# Patient Record
Sex: MALE | Race: WHITE | NOT HISPANIC OR LATINO | Employment: FULL TIME | URBAN - METROPOLITAN AREA
[De-identification: names, ages, dates, MRNs, and addresses within clinical notes are randomized per-mention and may not be internally consistent; named-entity substitution may affect disease eponyms.]

---

## 2017-12-09 ENCOUNTER — OFFICE VISIT (OUTPATIENT)
Dept: URGENT CARE | Facility: CLINIC | Age: 52
End: 2017-12-09
Payer: COMMERCIAL

## 2017-12-09 PROCEDURE — G0383 LEV 4 HOSP TYPE B ED VISIT: HCPCS

## 2017-12-09 PROCEDURE — 12041 INTMD RPR N-HF/GENIT 2.5CM/<: CPT

## 2017-12-10 NOTE — PROGRESS NOTES
Assessment    1  Finger laceration (883 0) (T94 673L)   1 5 cm macerated laceration of tip of left middle finger  Plan  Finger laceration    · Start: Cephalexin 500 MG Oral Capsule; TAKE 2 CAPSULE Twice daily   Complicated closure of laceration  Discussion/Summary  Discussion Summary:   Keep the wound clean and covered  Elevate the hand when possible  He may change the dressing daily or whenever it is dirty  Apply hydrogen peroxide and antibiotic ointment  Follow-up for suture removal in 7-10 days  Return sooner for any problems including swelling pain discharge redness or warmth  Medication Side Effects Reviewed: Possible side effects of new medications were reviewed with the patient/guardian today  Understands and agrees with treatment plan: The treatment plan was reviewed with the patient/guardian  The patient/guardian understands and agrees with the treatment plan   Counseling Documentation With Imm: The patient, patient's family was counseled regarding instructions for management,-- impressions  Chief Complaint    1  Skin Wound  Chief Complaint Free Text Note Form: Patient relates was using a circular saw x1 hour ago and piece of wood was going fall and he went to grab it and circular saw cut left middle finger  Last tetanus 2/26/13  No active bleeding  History of Present Illness  HPI: Patient relates was using a circular saw x1 hour ago and piece of wood was going fall and he went to grab it and circular saw cut left middle finger  Last tetanus 2/26/13  No active bleeding  Hospital Based Practices Required Assessment: Reason DV Screen not done: wife at bedside   Depression And Suicide Screen  Reason suicide screen not done: wife at bedside  Prefered Language is  english  Primary Language is  english  Review of Systems  Focused-Male:  Constitutional: no fever or chills, feels well, no tiredness, no recent weight loss or weight gain    Cardiovascular: no complaints of slow or fast heart rate, no chest pain, no palpitations, no leg claudication or lower extremity edema  Respiratory: no complaints of shortness of breath, no wheezing or cough, no dyspnea on exertion, no orthopnea or PND  Musculoskeletal: as noted in HPI  Active Problems  1  Finger laceration (883 0) (A06 014W)    Past Medical History  1  No pertinent past medical history    Family History  Mother   1  Family history of diabetes mellitus (V18 0) (Z83 3)  Father   2  Family history of cardiac disorder (V17 49) (Z82 49)    Social History   · Never a smoker    Surgical History  1  History of Knee Surgery Left  2  History of Knee Surgery Right    Current Meds  1  No Reported Medications Recorded    Allergies    1  No Known Drug Allergies    Vitals  Signs   Recorded: [id number] 11:49AM   Temperature: 99 2 F, Tympanic  Heart Rate: 83  Respiration: 18  Systolic: 954, RUE, Sitting  Diastolic: 86, RUE, Sitting  Height: 5 ft 8 in  Weight: 250 lb   BMI Calculated: 38 01  BSA Calculated: 2 25  O2 Saturation: 97, RA  Pain Scale: 2    Physical Exam  Exam shows macerated wound the tip of his left middle finger  No active bleeding  Neurovascular is intact  He has full range motion  Neither the nail no over the bone is involved  Procedure   Procedure: wound repair  The wound was located on the left 3rd finger(s)  The wound was complex  The wound involved the was irregular  The wound was contaminated  the neurovascular exam was normal  there was no tendon injury  The site was prepped with Betadine and cleansed  Anesthesia: Local anesthetic was administered  Lidocaine 2 ml, 1%, without epinephrine was injected  Closure: The cutaneous layer was closed with 4 sutures of 5-0 nylon--   simple interrupted sutures were used for the skin closure  Dressing: a sterile dressing was placed-- and-- an antibiotic ointment was applied  Patient Status:  the patient tolerated the procedure well   There were no complications      Signatures Electronically signed by : Itzel Macdonald DO; Dec  9 2017 12:31PM EST                       (Author)    Electronically signed by : Itzel Macdonald DO; Dec 13 2017  8:38AM EST                       (Author)

## 2017-12-19 ENCOUNTER — OFFICE VISIT (OUTPATIENT)
Dept: URGENT CARE | Facility: CLINIC | Age: 52
End: 2017-12-19
Payer: COMMERCIAL

## 2017-12-19 PROCEDURE — G0382 LEV 3 HOSP TYPE B ED VISIT: HCPCS

## 2017-12-20 NOTE — PROGRESS NOTES
Assessment  1  Encounter for removal of sutures (V58 32) (Z48 02)    Discussion/Summary  Discussion Summary:   Cleansed wound with betadine and removed 5 sutureskeep wound dry, clean, and covered  Medication Side Effects Reviewed: Possible side effects of new medications were reviewed with the patient/guardian today  Understands and agrees with treatment plan: The treatment plan was reviewed with the patient/guardian  The patient/guardian understands and agrees with the treatment plan   Counseling Documentation With Imm: The patient, patient's family was counseled regarding instructions for management,-- patient and family education,-- importance of compliance with treatment  Chief Complaint  1  Skin Wound  Chief Complaint Free Text Note Form: Stitch removal on left middle finger      History of Present Illness  HPI: 51yo M presents for suture removal from L middle finger  Pt had sutures placed her 10 days ago  Pt denies difficulty with sutures  Hospital Based Practices Required Assessment:  Abuse And Domestic Violence Screen   Yes, the patient is safe at home  -- The patient states no one is hurting them  Depression And Suicide Screen  No, the patient has not had thoughts of hurting themself  No, the patient has not felt depressed in the past 7 days  Prefered Language is  [place]  Primary Language is  English  Review of Systems  Focused-Male:  Constitutional: no fever or chills, feels well, no tiredness, no recent weight loss or weight gain  ENT: no complaints of earache, no loss of hearing, no nosebleeds or nasal discharge, no sore throat or hoarseness  Cardiovascular: no complaints of slow or fast heart rate, no chest pain, no palpitations, no leg claudication or lower extremity edema  Respiratory: no complaints of shortness of breath, no wheezing or cough, no dyspnea on exertion, no orthopnea or PND    Gastrointestinal: no complaints of abdominal pain, no constipation, no nausea or vomiting, no diarrhea or bloody stools  Genitourinary: no complaints of dysuria or incontinence, no hesitancy, no nocturia, no genital lesion, no inadequacy of penile erection  Musculoskeletal: no complaints of arthralgia, no myalgia, no joint swelling or stiffness, no limb pain or swelling  Integumentary: skin wound, but-- no rashes,-- no itching,-- no dry skin-- and-- no skin lesions  Neurological: no complaints of headache, no confusion, no numbness or tingling, no dizziness or fainting  ROS Reviewed:   ROS reviewed  Active Problems  1  Finger laceration (883 0) (M63 519K)    Past Medical History  1  No pertinent past medical history  Active Problems And Past Medical History Reviewed: The active problems and past medical history were reviewed and updated today  Family History  Mother    1  Family history of diabetes mellitus (V18 0) (Z83 3)  Father    2  Family history of cardiac disorder (V17 49) (Z82 49)  Family History Reviewed: The family history was reviewed and updated today  Social History   · Never a smoker  Social History Reviewed: The social history was reviewed and is unchanged  Surgical History  1  History of Knee Surgery Left   2  History of Knee Surgery Right  Surgical History Reviewed: The surgical history was reviewed and updated today  Current Meds   1  Cephalexin 500 MG Oral Capsule; TAKE 2 CAPSULE Twice daily; Therapy: [id number] to (Evaluate:16Ifz2419)  Requested for: 02YOL7100; Last Rx:89Hee5201 Ordered  Medication List Reviewed: The medication list was reviewed and updated today  Allergies  1   No Known Drug Allergies    Vitals  Signs   Recorded: 77HYA0263 10:15AM   Temperature: 98 1 F  Heart Rate: 74  Systolic: 062  Diastolic: 80  Height: 5 ft 8 in  Weight: 250 lb   BMI Calculated: 38 01  BSA Calculated: 2 25  Patient Refused Height: Yes  O2 Saturation: 96  Pain Scale: 2  Patient Refused Height: Yes    Physical Exam   Constitutional  General appearance: No acute distress, well appearing and well nourished  Pulmonary  Auscultation of lungs: Clear to auscultation  Cardiovascular  Auscultation of heart: Normal rate and rhythm, normal S1 and S2, without murmurs  Musculoskeletal  Gait and station: Normal    Digits and nails: Normal without clubbing or cyanosis  Inspection/palpation of joints, bones, and muscles: Normal    Skin  Skin and subcutaneous tissue: Abnormal  -- laceration to distal most aspect of L middle finger approximated with 5 sutures; no erythema, edema, ecchymosis, wound covered with black eschar  Neurologic  Cranial nerves: Cranial nerves 2-12 intact  Reflexes: 2+ and symmetric  Sensation: No sensory loss  -- pt vascularly and neurologically intact    Psychiatric  Orientation to person, place and time: Normal    Mood and affect: Normal        Signatures   Electronically signed by : BELIA Barton; Dec 19 2017 11:04AM EST                       (Author)    Electronically signed by : HEVER Alvarado ; Dec 19 2017 12:15PM EST                       (Co-author)

## 2018-01-23 VITALS
TEMPERATURE: 99.2 F | WEIGHT: 250 LBS | SYSTOLIC BLOOD PRESSURE: 161 MMHG | HEIGHT: 68 IN | BODY MASS INDEX: 37.89 KG/M2 | OXYGEN SATURATION: 97 % | RESPIRATION RATE: 18 BRPM | DIASTOLIC BLOOD PRESSURE: 86 MMHG | HEART RATE: 83 BPM

## 2018-01-23 VITALS
BODY MASS INDEX: 37.89 KG/M2 | SYSTOLIC BLOOD PRESSURE: 151 MMHG | DIASTOLIC BLOOD PRESSURE: 80 MMHG | HEIGHT: 68 IN | TEMPERATURE: 98.1 F | OXYGEN SATURATION: 96 % | HEART RATE: 74 BPM | WEIGHT: 250 LBS

## 2018-12-18 ENCOUNTER — OFFICE VISIT (OUTPATIENT)
Dept: URGENT CARE | Facility: CLINIC | Age: 53
End: 2018-12-18
Payer: COMMERCIAL

## 2018-12-18 VITALS
DIASTOLIC BLOOD PRESSURE: 86 MMHG | OXYGEN SATURATION: 94 % | BODY MASS INDEX: 37.13 KG/M2 | SYSTOLIC BLOOD PRESSURE: 143 MMHG | WEIGHT: 245 LBS | HEART RATE: 95 BPM | TEMPERATURE: 97.6 F | RESPIRATION RATE: 18 BRPM | HEIGHT: 68 IN

## 2018-12-18 DIAGNOSIS — H10.9 CONJUNCTIVITIS OF LEFT EYE, UNSPECIFIED CONJUNCTIVITIS TYPE: Primary | ICD-10-CM

## 2018-12-18 PROCEDURE — G0382 LEV 3 HOSP TYPE B ED VISIT: HCPCS | Performed by: PHYSICIAN ASSISTANT

## 2018-12-19 NOTE — PROGRESS NOTES
[facility] [facility]        NAME: Milady Smith is a 46 y o  male  : 1965    MRN: [id number]  DATE: 2018  TIME: 8:02 AM    Assessment and Plan   Conjunctivitis of left eye, unspecified conjunctivitis type [H10 9]  1  Conjunctivitis of left eye, unspecified conjunctivitis type       Patient Instructions     Follow up with eye doctor asap  Follow up with PCP in 3-5 days  Proceed to  ER if symptoms worsen  Chief Complaint     Chief Complaint   Patient presents with    Eye Problem     started yesterday  feels like something is in eye  states it is itchy and burns  History of Present Illness       Eye Problem    The left eye is affected  This is a new problem  The current episode started yesterday  The problem occurs constantly  The problem has been gradually worsening  The injury mechanism is unknown  The pain is moderate  Associated symptoms include eye redness, a foreign body sensation and itching  Pertinent negatives include no blurred vision, eye discharge, double vision, fever, nausea, photophobia, recent URI or vomiting  He has tried nothing for the symptoms  Review of Systems   Review of Systems   Constitutional: Negative for activity change, appetite change, chills, diaphoresis, fatigue, fever and unexpected weight change  Eyes: Positive for redness and itching  Negative for blurred vision, double vision, photophobia, pain, discharge and visual disturbance  Respiratory: Negative for apnea, cough, choking, chest tightness, shortness of breath, wheezing and stridor  Cardiovascular: Negative for chest pain, palpitations and leg swelling  Gastrointestinal: Negative for nausea and vomiting  Current Medications     No current outpatient prescriptions on file      Current Allergies     Allergies as of 2018    (No Known Allergies)            The following portions of the patient's history were reviewed and updated as appropriate: allergies, current medications, past family history, past medical history, past social history, past surgical history and problem list      Past Medical History:   Diagnosis Date    Allergic        Past Surgical History:   Procedure Laterality Date    KNEE ARTHROSCOPY Bilateral        No family history on file  Medications have been verified  Objective   /86   Pulse 95   Temp 97 6 °F (36 4 °C) (Tympanic)   Resp 18   Ht 5' 8" (1 727 m)   Wt 111 kg (245 lb)   SpO2 94%   BMI 37 25 kg/m²        Physical Exam     Physical Exam   Constitutional: He appears well-developed and well-nourished  Eyes: Left eye exhibits chemosis (mild)  Left eye exhibits no discharge, no exudate and no hordeolum  No foreign body present in the left eye  Left conjunctiva is injected  Left conjunctiva has no hemorrhage  No scleral icterus  Left eye exhibits normal extraocular motion and no nystagmus  Left pupil is round and reactive  Pupils are equal    Slit lamp exam:       The left eye shows fluorescein uptake  Cardiovascular: Normal rate, regular rhythm, normal heart sounds and intact distal pulses  Exam reveals no gallop and no friction rub  No murmur heard  Pulmonary/Chest: Effort normal and breath sounds normal  No respiratory distress  He has no wheezes  He has no rales  Abdominal: Soft  Bowel sounds are normal  He exhibits no distension  There is no tenderness  There is no rebound and no guarding

## 2021-03-09 ENCOUNTER — NURSE TRIAGE (OUTPATIENT)
Dept: OTHER | Facility: OTHER | Age: 56
End: 2021-03-09

## 2021-03-09 DIAGNOSIS — Z20.828 SARS-ASSOCIATED CORONAVIRUS EXPOSURE: Primary | ICD-10-CM

## 2021-03-10 DIAGNOSIS — Z20.828 SARS-ASSOCIATED CORONAVIRUS EXPOSURE: ICD-10-CM

## 2021-03-10 PROCEDURE — U0003 INFECTIOUS AGENT DETECTION BY NUCLEIC ACID (DNA OR RNA); SEVERE ACUTE RESPIRATORY SYNDROME CORONAVIRUS 2 (SARS-COV-2) (CORONAVIRUS DISEASE [COVID-19]), AMPLIFIED PROBE TECHNIQUE, MAKING USE OF HIGH THROUGHPUT TECHNOLOGIES AS DESCRIBED BY CMS-2020-01-R: HCPCS | Performed by: FAMILY MEDICINE

## 2021-03-10 PROCEDURE — U0005 INFEC AGEN DETEC AMPLI PROBE: HCPCS | Performed by: FAMILY MEDICINE

## 2021-03-10 NOTE — TELEPHONE ENCOUNTER
Regarding: Symptomatic COVID - cough #1 of 2   ----- Message from Cathy Randolph sent at 3/9/2021  7:33 PM EST -----  "I tested positive for COVID, my  has a cough and headache   He needs to get tested"

## 2021-03-10 NOTE — TELEPHONE ENCOUNTER
Reason for Disposition   [1] COVID-19 infection suspected by caller or triager AND [2] mild symptoms (cough, fever, or others) AND [8] no complications or SOB    Protocols used: CORONAVIRUS (COVID-19) [facility]AGNOSED OR [facility]USPECTED[facility]

## 2021-03-10 NOTE — TELEPHONE ENCOUNTER
1  Were you within 6 feet or less, for up to 15 minutes or more with a person that has a confirmed COVID-19 test? Yes wife is positive  2  What was the date of your exposure? Lives with wife  3  Are you experiencing any symptoms attributed to the virus?  (Assess for SOB, cough, fever, difficulty breathing) denies fever or shortness of breath, reports mild cough and headache  4   HIGH RISK: Do you have any history heart or lung conditions, weakened immune system, diabetes, Asthma, CHF, HIV, COPD, Chemo, renal failure, sickle cell, etc? denies

## 2021-03-11 ENCOUNTER — TELEPHONE (OUTPATIENT)
Dept: OTHER | Facility: OTHER | Age: 56
End: 2021-03-11

## 2021-03-11 LAB — SARS-COV-2 RNA RESP QL NAA+PROBE: POSITIVE

## 2021-03-11 NOTE — TELEPHONE ENCOUNTER
Your test for the novel coronavirus, also known as COVID-19, was positive  The sample showed that the virus was present  Positive COVID-19 test results are reportable to the [place] Department of Health  You may receive a call from trained public health staff to conduct an interview  It is important to answer their call  They will ask you to verify who you are  During the call they will ask you about what symptoms you have, what you did before you got sick, and who you were close to while sick  The health department does this to make sure everyone stays healthy and to reduce the spread of the virus  If you would like to verify if the caller does in fact work in contact tracing, call the [address] [place] [address] at [employer] ([phone])  For additional information, please visit the [employer] website: www health [place] gov     If you have any additional questions, we can schedule a virtual visit for you with a provider or call the [facility] [employer] [phone], option 7, for care advice    For additional information, please visit the Coronavirus FAQ on the [facility] home page ([facility])  Pt has a PCP for follow up care

## 2022-02-10 ENCOUNTER — HOSPITAL ENCOUNTER (OUTPATIENT)
Dept: NON INVASIVE DIAGNOSTICS | Facility: HOSPITAL | Age: 57
Discharge: HOME | End: 2022-02-10
Payer: COMMERCIAL

## 2022-02-10 ENCOUNTER — HOSPITAL ENCOUNTER (EMERGENCY)
Facility: HOSPITAL | Age: 57
Discharge: HOME | End: 2022-02-10
Attending: EMERGENCY MEDICINE | Admitting: EMERGENCY MEDICINE
Payer: COMMERCIAL

## 2022-02-10 VITALS
OXYGEN SATURATION: 97 % | HEIGHT: 68 IN | DIASTOLIC BLOOD PRESSURE: 95 MMHG | BODY MASS INDEX: 37.89 KG/M2 | WEIGHT: 250 LBS | HEART RATE: 78 BPM | SYSTOLIC BLOOD PRESSURE: 179 MMHG | TEMPERATURE: 97.7 F | RESPIRATION RATE: 20 BRPM

## 2022-02-10 DIAGNOSIS — M54.32 SCIATICA OF LEFT SIDE: Primary | ICD-10-CM

## 2022-02-10 DIAGNOSIS — M54.32 SCIATICA OF LEFT SIDE: ICD-10-CM

## 2022-02-10 PROCEDURE — 93971 EXTREMITY STUDY: CPT

## 2022-02-10 PROCEDURE — 96372 THER/PROPH/DIAG INJ SC/IM: CPT

## 2022-02-10 PROCEDURE — 99284 EMERGENCY DEPT VISIT MOD MDM: CPT

## 2022-02-10 PROCEDURE — 93971 EXTREMITY STUDY: CPT | Performed by: SURGERY

## 2022-02-10 PROCEDURE — 99284 EMERGENCY DEPT VISIT MOD MDM: CPT | Performed by: EMERGENCY MEDICINE

## 2022-02-10 RX ORDER — OXYCODONE HYDROCHLORIDE AND ACETAMINOPHEN 5; 325 MG/1; MG/1
1 TABLET ORAL EVERY 8 HOURS PRN
Qty: 12 TABLET | Refills: 0 | Status: SHIPPED | OUTPATIENT
Start: 2022-02-10 | End: 2022-02-20

## 2022-02-10 RX ORDER — KETOROLAC TROMETHAMINE 30 MG/ML
30 INJECTION, SOLUTION INTRAMUSCULAR; INTRAVENOUS ONCE
Status: COMPLETED | OUTPATIENT
Start: 2022-02-10 | End: 2022-02-10

## 2022-02-10 RX ORDER — DIAZEPAM 5 MG/1
5 TABLET ORAL EVERY 12 HOURS PRN
Qty: 20 TABLET | Refills: 0 | Status: SHIPPED | OUTPATIENT
Start: 2022-02-10 | End: 2022-02-20

## 2022-02-10 RX ORDER — DIAZEPAM 5 MG/ML
5 INJECTION, SOLUTION INTRAMUSCULAR; INTRAVENOUS ONCE
Status: COMPLETED | OUTPATIENT
Start: 2022-02-10 | End: 2022-02-10

## 2022-02-10 RX ADMIN — ENOXAPARIN SODIUM 105 MG: 120 INJECTION SUBCUTANEOUS at 01:40

## 2022-02-10 RX ADMIN — DIAZEPAM 5 MG: 10 INJECTION, SOLUTION INTRAMUSCULAR; INTRAVENOUS at 01:37

## 2022-02-10 RX ADMIN — KETOROLAC TROMETHAMINE 30 MG: 30 INJECTION, SOLUTION INTRAMUSCULAR at 01:36

## 2022-02-10 NOTE — ED PROVIDER NOTES
History  Chief Complaint   Patient presents with    Knee Pain     pt woke up with severe L hip pain around 2200  pain radiates down back of Left leg behind knee into feet causing numbness and tingling in toes     Patient complains of leg pain since 10:00 p m   States went to bed 9:00 p m , woke up at 10:00 p m  with pain  He describes a shooting pain from his hip/buttock down the leg to the foot  With some associated numbness tingling  No weakness  No saddle anesthesia  No bowel or bladder incontinence  No urinary retention  No back pain  No fevers  No recent trauma  History provided by:  Patient   used: No    Leg Pain  Location:  Leg and buttock  Buttock location:  L buttock  Leg location:  L leg  Pain details:     Quality:  Aching and sharp    Radiates to:  L leg    Severity:  Moderate    Onset quality:  Gradual    Timing:  Constant    Progression:  Unchanged  Chronicity:  New  Prior injury to area:  No  Relieved by:  Nothing  Worsened by:  Nothing  Ineffective treatments:  None tried  Associated symptoms: numbness and tingling    Associated symptoms: no back pain, no decreased ROM, no fever, no muscle weakness, no neck pain, no stiffness and no swelling        None       Past Medical History:   Diagnosis Date    Allergic        Past Surgical History:   Procedure Laterality Date    KNEE ARTHROSCOPY Bilateral     REPLACEMENT TOTAL KNEE BILATERAL         History reviewed  No pertinent family history  I have reviewed and agree with the history as documented  E-Cigarette/Vaping     E-Cigarette/Vaping Substances     Social History     Tobacco Use    Smoking status: Never Smoker    Smokeless tobacco: Never Used   Substance Use Topics    Alcohol use: Not on file    Drug use: Not on file       Review of Systems   Constitutional: Negative for chills and fever  HENT: Negative for ear pain, hearing loss, sore throat, trouble swallowing and voice change      Eyes: Negative for pain and discharge  Respiratory: Negative for cough, shortness of breath and wheezing  Cardiovascular: Negative for chest pain and palpitations  Gastrointestinal: Negative for abdominal pain, blood in stool, constipation, diarrhea, nausea and vomiting  Genitourinary: Negative for dysuria, flank pain, frequency and hematuria  Musculoskeletal: Negative for back pain, joint swelling, neck pain, neck stiffness and stiffness  Skin: Negative for rash and wound  Neurological: Negative for dizziness, seizures, syncope, facial asymmetry and headaches  Psychiatric/Behavioral: Negative for hallucinations, self-injury and suicidal ideas  All other systems reviewed and are negative  Physical Exam  Physical Exam  Vitals and nursing note reviewed  Constitutional:       General: He is not in acute distress  Appearance: Normal appearance  He is well-developed  He is not ill-appearing or diaphoretic  HENT:      Head: Normocephalic and atraumatic  Right Ear: External ear normal       Left Ear: External ear normal    Eyes:      General: No scleral icterus  Right eye: No discharge  Left eye: No discharge  Extraocular Movements: Extraocular movements intact  Conjunctiva/sclera: Conjunctivae normal    Pulmonary:      Effort: Pulmonary effort is normal  No respiratory distress  Musculoskeletal:         General: No swelling or deformity  Normal range of motion  Cervical back: Normal range of motion and neck supple  Comments: No deformity of the left leg  There are tender areas in the left buttock and the soft tissues near the left hip  There is no tenseness in the compartments of the calf for the thigh  There is normal lower extremity strength including great toe dorsiflexion  Patient is able to ambulate unassisted without footdrop  Straight leg raising on the left is positive  Distal neurovascular function is normal    Skin:     General: Skin is dry  Coloration: Skin is not jaundiced or pale  Findings: No rash  Neurological:      General: No focal deficit present  Mental Status: He is alert and oriented to person, place, and time  Cranial Nerves: No cranial nerve deficit  Motor: No weakness  Coordination: Coordination normal       Gait: Gait normal    Psychiatric:         Mood and Affect: Mood normal          Behavior: Behavior normal          Thought Content: Thought content normal          Judgment: Judgment normal          Vital Signs  ED Triage Vitals   Temperature Pulse Respirations Blood Pressure SpO2   02/10/22 0129 02/10/22 0129 02/10/22 0129 02/10/22 0129 02/10/22 0129   97 7 °F (36 5 °C) 78 20 (!) 179/95 97 %      Temp Source Heart Rate Source Patient Position - Orthostatic VS BP Location FiO2 (%)   02/10/22 0129 02/10/22 0129 02/10/22 0129 02/10/22 0129 --   Temporal Monitor Sitting Right arm       Pain Score       02/10/22 0136       9           Vitals:    02/10/22 0129   BP: (!) 179/95   Pulse: 78   Patient Position - Orthostatic VS: Sitting         Visual Acuity      ED Medications  Medications   ketorolac (TORADOL) injection 30 mg (30 mg Intramuscular Given 2/10/22 0136)   diazepam (VALIUM) injection 5 mg (5 mg Intravenous Given 2/10/22 0137)   enoxaparin (LOVENOX) subcutaneous injection 105 mg (105 mg Subcutaneous Given 2/10/22 0140)       Diagnostic Studies  Results Reviewed     None                 VAS lower limb venous duplex study, unilateral/limited    (Results Pending)              Procedures  Procedures         ED Course                               SBIRT 22yo+      Most Recent Value   SBIRT (23 yo +)    In order to provide better care to our patients, we are screening all of our patients for alcohol and drug use  Would it be okay to ask you these screening questions?  No Filed at: 02/10/2022 0143                    MDM  Number of Diagnoses or Management Options  Sciatica of left side  Diagnosis management comments: History and examination consistent with sciatica  Patient with some improvement after IM medications  Prescriptions given  Outpatient ultrasound to rule out DVT set up  Disposition  Final diagnoses:   Sciatica of left side     Time reflects when diagnosis was documented in both MDM as applicable and the Disposition within this note     Time User Action Codes Description Comment    2/10/2022  1:33 AM Mitali Wells Add [M54 32] Sciatica of left side       ED Disposition     ED Disposition Condition Date/Time Comment    Discharge Stable Thu Feb 10, 2022  1:33 AM Malena Flores discharge to home/self care  Follow-up Information     Follow up With Specialties Details Why Contact Info    Xiang Mina [address] [place] [id number]  [phone]            Discharge Medication List as of 2/10/2022  2:00 AM      START taking these medications    Details   diazepam (VALIUM) 5 mg tablet Take 1 tablet (5 mg total) by mouth every 12 (twelve) hours as needed for muscle spasms for up to 10 days, Starting u 2/10/2022, Until Sun 2/20/2022 at 2359, Normal      oxyCODONE-acetaminophen (Percocet) 5-325 mg per tablet Take 1 tablet by mouth every 8 (eight) hours as needed for moderate pain for up to 10 days Max Daily Amount: 3 tablets, Starting u 2/10/2022, Until Sun 2/20/2022 at 2359, Normal             Outpatient Discharge Orders   VAS lower limb venous duplex study, unilateral/limited   Standing Status: Future Standing Exp   Date: 02/10/26       PDMP Review     None          ED Provider  Electronically Signed by           Avani Bourgeois MD  02/10/22 5996

## 2022-02-10 NOTE — DISCHARGE INSTRUCTIONS
An outpatient ultrasound of your leg has been arranged  You should receive a call from the vascular lab by noon to schedule the appointment for the ultrasound    If you do not hear from them by that time, please call [phone] to schedule the appointment

## 2022-02-26 ENCOUNTER — OFFICE VISIT (OUTPATIENT)
Dept: URGENT CARE | Facility: CLINIC | Age: 57
End: 2022-02-26
Payer: COMMERCIAL

## 2022-02-26 VITALS
TEMPERATURE: 98.6 F | HEART RATE: 84 BPM | RESPIRATION RATE: 18 BRPM | WEIGHT: 250 LBS | SYSTOLIC BLOOD PRESSURE: 146 MMHG | BODY MASS INDEX: 37.89 KG/M2 | HEIGHT: 68 IN | OXYGEN SATURATION: 98 % | DIASTOLIC BLOOD PRESSURE: 76 MMHG

## 2022-02-26 DIAGNOSIS — M54.32 SCIATICA OF LEFT SIDE: Primary | ICD-10-CM

## 2022-02-26 PROCEDURE — G0382 LEV 3 HOSP TYPE B ED VISIT: HCPCS | Performed by: EMERGENCY MEDICINE

## 2022-02-26 RX ORDER — PREDNISONE 10 MG/1
TABLET ORAL
Qty: 27 TABLET | Refills: 0 | Status: SHIPPED | OUTPATIENT
Start: 2022-02-26

## 2022-02-26 RX ORDER — CYCLOBENZAPRINE HCL 10 MG
10 TABLET ORAL 3 TIMES DAILY PRN
Qty: 20 TABLET | Refills: 0 | Status: SHIPPED | OUTPATIENT
Start: 2022-02-26

## 2022-02-26 NOTE — PATIENT INSTRUCTIONS
Crucifix stretch (or sometimes also referred to as crocodile or iron cross), as demonstrated on awakening and while still in bed, then activate your deep core muscles by imagining that you are pinning your navel to your spine and hold for reps of 10 seconds as discussed  Core bracing as demonstrated  Squat stretch as demonstrated  Hanging traction as discussed  [employer] up as discussed  Bird Dog Core exercise as discussed  Glute bridge as discussed  Side Bridge as discussed  Use heat 10 - 15 minutes every 2 - 3 hrs especially before stretching, but may use ice for reducing inflammation  Hold any NSAIDs like Ibuprofen (Advil), Naprosyn (Aleve), etc while on steroids like Medrol or Prednisone  If you are diabetic you should adhere strictly to your diabetic diet and monitor blood sugar closely while on prednisone and you should discontinue the prednisone if blood sugar becomes significantly elevated  Sciatica   WHAT YOU NEED TO KNOW:   Sciatica is a condition that causes pain along your sciatic nerve  The sciatic nerve runs from your spine through both sides of your buttocks  It then runs down the back of your thigh, into your lower leg and foot  Your sciatic nerve may be compressed, inflamed, irritated, or stretched  DISCHARGE INSTRUCTIONS:   Medicines:   · NSAIDs:  These medicines decrease swelling and pain  NSAIDs are available without a doctor's order  Ask your healthcare provider which medicine is right for you  Ask how much to take and when to take it  Take as directed  NSAIDs can cause stomach bleeding or kidney problems if not taken correctly  · Acetaminophen: This medicine decreases pain  Acetaminophen is available without a doctor's order  Ask how much to take and when to take it  Follow directions  Acetaminophen can cause liver damage if not taken correctly  · Muscle relaxers  help decrease pain and muscle spasms  · Take your medicine as directed    Contact your healthcare provider if you think your medicine is not helping or if you have side effects  Tell him of her if you are allergic to any medicine  Keep a list of the medicines, vitamins, and herbs you take  Include the amounts, and when and why you take them  Bring the list or the pill bottles to follow-up visits  Carry your medicine list with you in case of an emergency  Follow up with your doctor as directed:  Write down your questions so you remember to ask them during your visits  Manage your symptoms:   · Activity:  Decrease your activity  Do not lift heavy objects or twist your back for at least 6 weeks  Slowly return to your usual activity  · Ice:  Ice helps decrease swelling and pain  Ice may also help prevent tissue damage  Use an ice pack, or put crushed ice in a plastic bag  Cover it with a towel and place it on your low back or leg for 15 to 20 minutes every hour or as directed  · Heat:  Heat helps decrease pain and muscle spasms  Apply heat on the area for 20 to 30 minutes every 2 hours for as many days as directed  · Physical therapy:  You may need to see physical therapist to teach you exercises to help improve movement and strength, and to decrease pain  An occupational therapist teaches you skills to help with your daily activities  · Use assistive devices if directed: You may need to wear back support, such as a back brace  You may need crutches, a cane, or a walker to decrease stress on your lower back and leg muscles  Ask your healthcare provider for more information about assistive devices and how to use them correctly  Self-care:   · Avoid pressure on your back and legs:  Do not  lift heavy objects, or stand or sit for long periods of time  · Lift objects safely:  Keep your back straight and bend your knees when you  an object  Do not bend or twist your back when you lift  · Maintain a healthy weight:  Ask your healthcare provider how much you should weigh   Ask him to help you create a weight loss plan if you are overweight  · Exercise:  Ask your healthcare provider about the best stretching, warmup, and exercise plan for you  Contact your healthcare provider if:   · You have pain in your lower back at night or when resting  · You have pain in your lower back with numbness below the knee  · You have weakness in one leg only  · You have questions or concerns about your condition or care  Return to the emergency department if:   · You have trouble holding back your urine or bowel movements  · You have weakness in both legs  · You have numbness in your groin or buttocks  © Copyright [employer] 2022 Information is for End User's use only and may not be sold, redistributed or otherwise used for commercial purposes  All illustrations and images included in CareNotes® are the copyrighted property of [employer]  or [employer]  The above information is an  only  It is not intended as medical advice for individual conditions or treatments  Talk to your doctor, nurse or pharmacist before following any medical regimen to see if it is safe and effective for you

## 2022-02-26 NOTE — PROGRESS NOTES
[facility]        NAME: Eliza Zimmerman is a 61 y o  male  : 1965    MRN: [id number]  DATE: 2022  TIME: 10:18 AM    Assessment and Plan   Sciatica of left side [M54 32]  1  Sciatica of left side  Ambulatory Referral to Physical Therapy    predniSONE 10 mg tablet         Patient Instructions     Patient Instructions   Crucifix stretch (or sometimes also referred to as crocodile or iron cross), as demonstrated on awakening and while still in bed, then activate your deep core muscles by imagining that you are pinning your navel to your spine and hold for reps of 10 seconds as discussed  Core bracing as demonstrated  Squat stretch as demonstrated  Hanging traction as discussed  [employer] up as discussed  Bird Dog Core exercise as discussed  Glute bridge as discussed  Side Bridge as discussed  Use heat 10 - 15 minutes every 2 - 3 hrs especially before stretching, but may use ice for reducing inflammation  Hold any NSAIDs like Ibuprofen (Advil), Naprosyn (Aleve), etc while on steroids like Medrol or Prednisone  If you are diabetic you should adhere strictly to your diabetic diet and monitor blood sugar closely while on prednisone and you should discontinue the prednisone if blood sugar becomes significantly elevated  Sciatica   WHAT YOU NEED TO KNOW:   Sciatica is a condition that causes pain along your sciatic nerve  The sciatic nerve runs from your spine through both sides of your buttocks  It then runs down the back of your thigh, into your lower leg and foot  Your sciatic nerve may be compressed, inflamed, irritated, or stretched  DISCHARGE INSTRUCTIONS:   Medicines:   · NSAIDs:  These medicines decrease swelling and pain  NSAIDs are available without a doctor's order  Ask your healthcare provider which medicine is right for you  Ask how much to take and when to take it  Take as directed   NSAIDs can cause stomach bleeding or kidney problems if not taken correctly  · Acetaminophen: This medicine decreases pain  Acetaminophen is available without a doctor's order  Ask how much to take and when to take it  Follow directions  Acetaminophen can cause liver damage if not taken correctly  · Muscle relaxers  help decrease pain and muscle spasms  · Take your medicine as directed  Contact your healthcare provider if you think your medicine is not helping or if you have side effects  Tell him of her if you are allergic to any medicine  Keep a list of the medicines, vitamins, and herbs you take  Include the amounts, and when and why you take them  Bring the list or the pill bottles to follow-up visits  Carry your medicine list with you in case of an emergency  Follow up with your doctor as directed:  Write down your questions so you remember to ask them during your visits  Manage your symptoms:   · Activity:  Decrease your activity  Do not lift heavy objects or twist your back for at least 6 weeks  Slowly return to your usual activity  · Ice:  Ice helps decrease swelling and pain  Ice may also help prevent tissue damage  Use an ice pack, or put crushed ice in a plastic bag  Cover it with a towel and place it on your low back or leg for 15 to 20 minutes every hour or as directed  · Heat:  Heat helps decrease pain and muscle spasms  Apply heat on the area for 20 to 30 minutes every 2 hours for as many days as directed  · Physical therapy:  You may need to see physical therapist to teach you exercises to help improve movement and strength, and to decrease pain  An occupational therapist teaches you skills to help with your daily activities  · Use assistive devices if directed: You may need to wear back support, such as a back brace  You may need crutches, a cane, or a walker to decrease stress on your lower back and leg muscles  Ask your healthcare provider for more information about assistive devices and how to use them correctly      Self-care: · Avoid pressure on your back and legs:  Do not  lift heavy objects, or stand or sit for long periods of time  · Lift objects safely:  Keep your back straight and bend your knees when you  an object  Do not bend or twist your back when you lift  · Maintain a healthy weight:  Ask your healthcare provider how much you should weigh  Ask him to help you create a weight loss plan if you are overweight  · Exercise:  Ask your healthcare provider about the best stretching, warmup, and exercise plan for you  Contact your healthcare provider if:   · You have pain in your lower back at night or when resting  · You have pain in your lower back with numbness below the knee  · You have weakness in one leg only  · You have questions or concerns about your condition or care  Return to the emergency department if:   · You have trouble holding back your urine or bowel movements  · You have weakness in both legs  · You have numbness in your groin or buttocks  © Copyright [employer] 2022 Information is for End User's use only and may not be sold, redistributed or otherwise used for commercial purposes  All illustrations and images included in CareNotes® are the copyrighted property of A D A M , Inc  or [facility] [email or web address] [facility]ore[facility]  The above information is an  only  It is not intended as medical advice for individual conditions or treatments  Talk to your doctor, nurse or pharmacist before following any medical regimen to see if it is safe and effective for you  Follow up with PCP in 3-5 days  Proceed to  ER if symptoms worsen  Chief Complaint     Chief Complaint   Patient presents with    Pain     left foot and ankle pain x 3 weeks          History of Present Illness       Patient complains of pain left low back with radiation to left foot waxing waning for past 3 weeks    He was seen recently in the ER for this had a negative lower extremity Doppler, was diagnosed with sciatica, treated with Percocet and Valium  He continues to complain of the low back pain with radiation to left foot after he ran out of the prescribed medications  He denies any recent trauma  He denies any bowel or bladder symptoms  Review of Systems   Review of Systems   Constitutional: Negative for chills and fever  Respiratory: Negative  Cardiovascular: Negative  Musculoskeletal: Positive for back pain  Skin: Negative for rash  Neurological: Negative for weakness and numbness  Current Medications       Current Outpatient Medications:     diazepam (VALIUM) 5 mg tablet, Take 1 tablet (5 mg total) by mouth every 12 (twelve) hours as needed for muscle spasms for up to 10 days, Disp: 20 tablet, Rfl: 0    predniSONE 10 mg tablet, Take once daily all days pills on this schedule 6- 6- 5- 4- 3- 2- 1, Disp: 27 tablet, Rfl: 0    Current Allergies     Allergies as of 02/26/2022    (No Known Allergies)            The following portions of the patient's history were reviewed and updated as appropriate: allergies, current medications, past family history, past medical history, past social history, past surgical history and problem list      Past Medical History:   Diagnosis Date    Allergic        Past Surgical History:   Procedure Laterality Date    KNEE ARTHROSCOPY Bilateral     REPLACEMENT TOTAL KNEE BILATERAL         History reviewed  No pertinent family history  Medications have been verified  Objective   /76   Pulse 84   Temp 98 6 °F (37 °C) (Temporal)   Resp 18   Ht 5' 8" (1 727 m)   Wt 113 kg (250 lb)   SpO2 98%   BMI 38 01 kg/m²        Physical Exam     Physical Exam  Vitals and nursing note reviewed  Constitutional:       General: He is not in acute distress  Appearance: He is well-developed  Cardiovascular:      Rate and Rhythm: Normal rate and regular rhythm     Pulmonary:      Effort: Pulmonary effort is normal       Breath sounds: Normal breath sounds  Musculoskeletal:         General: Tenderness present  No deformity  Cervical back: Neck supple  Comments: Tender left SI joint   Skin:     General: Skin is warm and dry  Findings: No erythema or rash  Neurological:      Mental Status: He is alert and oriented to person, place, and time  Deep Tendon Reflexes: Reflexes are normal and symmetric  Psychiatric:         Mood and Affect: Mood normal          Behavior: Behavior normal          Thought Content:  Thought content normal          Judgment: Judgment normal

## 2022-03-01 ENCOUNTER — EVALUATION (OUTPATIENT)
Dept: PHYSICAL THERAPY | Facility: CLINIC | Age: 57
End: 2022-03-01
Payer: COMMERCIAL

## 2022-03-01 DIAGNOSIS — M54.32 SCIATICA OF LEFT SIDE: Primary | ICD-10-CM

## 2022-03-01 PROCEDURE — 97110 THERAPEUTIC EXERCISES: CPT | Performed by: PHYSICAL THERAPIST

## 2022-03-01 PROCEDURE — 97162 PT EVAL MOD COMPLEX 30 MIN: CPT | Performed by: PHYSICAL THERAPIST

## 2022-03-01 NOTE — PROGRESS NOTES
PT Evaluation     Today's date: 3/1/2022  Patient name: Peewee Rao  : 1965  MRN: [id number]  Referring provider: Skye Mishra MD  Dx:   Encounter Diagnosis     ICD-10-CM    1  Sciatica of left side  M54 32 Ambulatory Referral to Physical Therapy                  Assessment  Assessment details: Peewee Rao is a pleasant 61 y o  male presenting to PT with cc of acute L foot, shin, and outer leg pain with associated n/t and weakness  Pt  States pain began insidiously 3 weeks  Upon examination, patient was found to have objective deficits as listed below and is displaying ss consistent with L sided sciatica with peroneal nerve restrictions, L5 dermatomal pattern  No red flags or positive lumbar testing with AROM WNL  Is experiencing subsequent functional deficits including difficuty walking, working as , and lifting  Pt  Was educated on role of PT to address issues and given initial treatment focusing on NMES of tib anterior as well as nerve glides  No referral appears necessary at this point, though explained that due to mm weakness, if no progress in 2 weeks will refer to spine and pain  Pt  Would benefit from skilled physical therapy to promote improved function and maximize activity tolerance  Symptom irritability: highUnderstanding of Dx/Px/POC: good   Prognosis: good    Goals  ST weeks  -Pt  Will demonstrate LDF AROM improvement to 5*  -Pt  Will demonstrate improved core stabilizer contraction, promoting improved muscle balance  LT weeks  -Pt  Will demonstrate ability to walk 1 mile without pain, demonstrating improved functional mobility   -Pt  Will demonstrate L ankle AROM WNL  -Pt  Will demonstrate I with HEP upon DC  -Pt  Will demonstrate ability to effectively contract core mm with appropriate strength to perform lifting task        Plan  Patient would benefit from: skilled physical therapy  Planned modality interventions: high voltage pulsed current: spasm management, high voltage pulsed current: pain management, unattended electrical stimulation and thermotherapy: hydrocollator packs  Planned therapy interventions: abdominal trunk stabilization, body mechanics training, functional ROM exercises, flexibility, graded exercise, graded motor, home exercise program, therapeutic exercise, stretching, strengthening, therapeutic activities, therapeutic training, patient education and postural training  Frequency: 2x week  Duration in weeks: 6  Plan of Care beginning date: 3/1/2022  Plan of Care expiration date: 2022  Treatment plan discussed with: patient        Subjective Evaluation    History of Present Illness  Mechanism of injury: Pt  Presents to PT with cc of acute L lower leg, hip, and foot pain for past 3 weeks  Pt  Denies any WICHO, but notes severe L hip to foot pain beginning 2/10 which caused him to go to ER  He had doppler study 2* swelling and calf pain which was unremarkable  He notes pain was unchanged and f/u with carenow  He was given prednisone taper which he notes is helping with swelling but pain remains in L shin and foot  He has difficulty walking due to worsening foot drop  Pt  Works as  at [employer] and is on his feet most of the day     No imaging  Denies any bowel bladder changes or saddle paresthesias  Pain  Current pain ratin  At best pain ratin  At worst pain rating: 10  Quality: tight, radiating, sharp, pressure and needle-like  Relieving factors: rest, ice and medications  Aggravating factors: walking, standing and lifting  Progression: worsening    Social Support  Steps to enter house: yes  Stairs in house: yes   Lives with: spouse and young children    Employment status: working  Treatments  Previous treatment: medication  Current treatment: medication  Patient Goals  Patient goals for therapy: decreased pain, increased motion, return to work, return to [employer] activities, independence with ADLs/IADLs and increased strength          Objective     Concurrent Complaints  Positive for disturbed sleep  Negative for bladder dysfunction, bowel dysfunction and saddle (S4) numbness    Postural Observations  Seated posture: good  Standing posture: good        Palpation   Left   Tenderness of the erector spinae and quadratus lumborum  Right   Tenderness of the erector spinae and quadratus lumborum  Tenderness     Additional Tenderness Details  Minimal l/s ttp    Neurological Testing     Sensation     Lumbar   Left   Diminished: light touch  Paresthesia: light touch    Right   Intact: light touch    Comments   Left light touch: L5 dermatome    Reflexes   Left   Patellar (L4): normal (2+)  Achilles (S1): normal (2+)    Right   Patellar (L4): normal (2+)  Achilles (S1): normal (2+)    Active Range of Motion     Lumbar   Normal active range of motion    Joint Play   Joints within functional limits: L2, L3 and L4     Hypomobile: L5 and S1     Strength/Myotome Testing     Lumbar     Right   Normal strength    Left Ankle/Foot   Dorsiflexion: 2  Great toe extension: 3+    Right Ankle/Foot   Dorsiflexion: 5    Tests     Lumbar     Left   Positive femoral stretch and passive SLR       General Comments:    Lower quarter screen   Foot/ankle: unremarkable    Hip Comments   IR restriction to 2-5* B    Piriformis ttp    Knee Comments  Medial lateral instabiity noted, significant ttp at fibular head      Ankle/Foot Comments   No swelling at this point though he notes it returns with prolonged standing             Precautions: 3/1     Daily Treatment Diary:      Initial Evaluation Date: 03/01/22  Compliance 3/1                     Visit Number 1                    Susana-Sowmya  IE                 [facility]   [employer] Captured Y                           3/1                     Manual                      Nerve gldies -                     Piriformis stm -                                           Ther-Ex                      Warm up - Nerve glide 20x                     Ankle pumps -                     Toe raises 20x                     Heel raises -                     Piriformis stretch 4x30"                                                                                       NMES dorsiflexon 10"/10" Turkmen                     Neuro Re-Ed                                                                                                Ther-Act                                                               Modalities                      Cp prn -

## 2022-03-03 ENCOUNTER — APPOINTMENT (OUTPATIENT)
Dept: PHYSICAL THERAPY | Facility: CLINIC | Age: 57
End: 2022-03-03
Payer: COMMERCIAL

## 2022-03-08 ENCOUNTER — OFFICE VISIT (OUTPATIENT)
Dept: PHYSICAL THERAPY | Facility: CLINIC | Age: 57
End: 2022-03-08
Payer: COMMERCIAL

## 2022-03-08 DIAGNOSIS — M54.32 SCIATICA OF LEFT SIDE: Primary | ICD-10-CM

## 2022-03-08 PROCEDURE — 97110 THERAPEUTIC EXERCISES: CPT

## 2022-03-08 PROCEDURE — 97140 MANUAL THERAPY 1/> REGIONS: CPT

## 2022-03-10 ENCOUNTER — OFFICE VISIT (OUTPATIENT)
Dept: PHYSICAL THERAPY | Facility: CLINIC | Age: 57
End: 2022-03-10
Payer: COMMERCIAL

## 2022-03-10 DIAGNOSIS — M54.16 LUMBAR RADICULOPATHY: Primary | ICD-10-CM

## 2022-03-10 PROCEDURE — 97110 THERAPEUTIC EXERCISES: CPT

## 2022-03-10 PROCEDURE — 97140 MANUAL THERAPY 1/> REGIONS: CPT

## 2022-03-10 NOTE — PROGRESS NOTES
Daily Note     Today's date: 3/10/2022  Patient name: Malena Flores  : 1965  MRN: [id number]  Referring provider: Gita Holland MD  Dx: No diagnosis found  Subjective: Reports his doral R foot is pain  Hurts more when wearing his shoe on  Objective: See treatment diary below      Assessment: Tolerated treatment well  Patient completed ex program within weakness, and discomfort to his R dorsal foot, LB-piriformis muscle tension  Provided VC's through session  Will advance when able to perform  Patient would benefit from continued PT      Plan: Continue per plan of care        Precautions: 3/1     Daily Treatment Diary:      Initial Evaluation Date: 22  Compliance 3/1  3/8  3/10                 Visit Number 1 2  3                 Re-Eval  IE                 [facility] Captured Y                           3/1  3/8  3/10                 Manual                      Nerve gldies -  sj                   Piriformis stm -  sj                                         Ther-Ex                      Warm up -  [facility] 10'  [facility]  10'                 Nerve glide 20x  30x 20x                 Ankle pumps -  x30  30x                 Toe raises 20x  20x  20x                 Heel raises -  2x10  2x10                 Piriformis stretch 4x30"  4x30"  4x30''                                                                                   NMES dorsiflexon 10"/10" Mauritian 10"/10" Mauritian  10x10''                 Neuro Re-Ed                                                                                                Ther-Act                                                               Modalities                      Cp prn -

## 2022-03-15 ENCOUNTER — OFFICE VISIT (OUTPATIENT)
Dept: PHYSICAL THERAPY | Facility: CLINIC | Age: 57
End: 2022-03-15
Payer: COMMERCIAL

## 2022-03-15 DIAGNOSIS — M54.16 LUMBAR RADICULOPATHY: Primary | ICD-10-CM

## 2022-03-15 DIAGNOSIS — M54.32 SCIATICA OF LEFT SIDE: ICD-10-CM

## 2022-03-15 PROCEDURE — 97110 THERAPEUTIC EXERCISES: CPT

## 2022-03-15 PROCEDURE — 97140 MANUAL THERAPY 1/> REGIONS: CPT

## 2022-03-15 NOTE — PROGRESS NOTES
Daily Note     Today's date: 3/15/2022  Patient name: Terri Mondragon  : 1965  MRN: [id number]  Referring provider: Shantell Ruano MD  Dx:   Encounter Diagnosis     ICD-10-CM    1  Lumbar radiculopathy  M54 16    2  Sciatica of left side  M54 32                   Subjective: Patient reports has decreasing radicular symptoms  Objective: See treatment diary below      Assessment: Terri Mondragon continues with steady  progress towards symptom resolution goals  Patient movement appears to be improved  he required moderate verbal cueing to complete exercises appropriate form and intensity with no tactile cues  Patient function should improve with continued treatments  Plan: Continue per plan of care        Precautions: 3/1     Daily Treatment Diary:      Initial Evaluation Date: 22  Compliance 3/1  3/8  3/10  3/15               Visit Number 1 2  3  4               Re-Eval  IE                 [address] [place] [address] Captured Y                           3/1  3/8  3/10 3/15               Manual                      Nerve gldies -  sj    sj               Piriformis stm -  sj    sj                                     Ther-Ex                      Warm up -  [facility] 10  [facility]  10  [facility]  10'               Nerve glide 20x  30x 20x  20x               Ankle pumps -  x30  30x  30x               Toe raises 20x  20x  20x  x30               Heel raises -  2x10  2x10  2x10               Piriformis stretch 4x30"  4x30"  4x30''  4x30"               therband dorsi flexion eccentric       YTB  2x10                                                           NMES dorsiflexon 10"/10" Thai 10"/10" Thai  10x10''  10"/10" russian               Neuro Re-Ed                                                                                                Ther-Act                                                               Modalities                      Cp prn -

## 2022-03-17 ENCOUNTER — OFFICE VISIT (OUTPATIENT)
Dept: PHYSICAL THERAPY | Facility: CLINIC | Age: 57
End: 2022-03-17
Payer: COMMERCIAL

## 2022-03-17 DIAGNOSIS — M54.32 SCIATICA OF LEFT SIDE: ICD-10-CM

## 2022-03-17 DIAGNOSIS — M54.16 LUMBAR RADICULOPATHY: Primary | ICD-10-CM

## 2022-03-17 PROCEDURE — 97110 THERAPEUTIC EXERCISES: CPT

## 2022-03-17 PROCEDURE — 97140 MANUAL THERAPY 1/> REGIONS: CPT

## 2022-03-17 NOTE — PROGRESS NOTES
Daily Note     Today's date: 3/17/2022  Patient name: Mago Vega  : 1965  MRN: [id number]  Referring provider: Sony Rodriguez MD  Dx:   Encounter Diagnosis     ICD-10-CM    1  Lumbar radiculopathy  M54 16    2  Sciatica of left side  M54 32                   Subjective: Patient reports radicular symptoms continue but less  Feeling foot is moving better  Objective: See treatment diary below      Assessment: Mago Vega continues with steady  progress towards movement goals  Patient strength appears to be greatly improved  he required moderate verbal cueing to complete exercises appropriate form and intensity with no tactile cues  Patient radicular symptoms should resolve with continued treatments  Plan: Continue per plan of care        Precautions: 3/1     Daily Treatment Diary:      Initial Evaluation Date: 22  Compliance 3/1  3/8  3/10  3/15  3/17             Visit Number 1 2  3  4  5             Re-Eval  IE                 [facility]   Foto Captured Y                           3/1  3/8  3/10 3/15  3/17             Manual                      Nerve gldies -  sj    sj  sj             Piriformis stm -  sj    sj sj                                   Ther-Ex                      Warm up -  [facility] 10'  [facility]  10'  [facility]  [facility]  10'             Nerve glide 20x  30x 20x  20x  20x             Ankle pumps -  x30  30x  30x  30x             Toe raises 20x  20x  20x  x30  x30             Heel raises -  2x10  2x10  2x10  2x10             Piriformis stretch 4x30"  4x30"  4x30''  4x30"  30"x4             therband dorsi flexion eccentric       YTB  2x10  YTB  2x10             Alphabet         2x                                   NMES dorsiflexon 10"/10" Venezuel[place] 10"/10" Venezuel[place]  10x10''  10"/10" [place]  10"/10" Venezuelan             Neuro Re-Ed                                                                                                [employer] Modalities                      Cp prn -

## 2022-04-27 NOTE — PROGRESS NOTES
Discharge Summary:  Kristie Moore contacted PT to self D/C  Believes he has reached max potential  Will contact if follow up is necessary       Shruthi Graft PTA

## 2022-11-06 ENCOUNTER — OFFICE VISIT (OUTPATIENT)
Dept: URGENT CARE | Facility: CLINIC | Age: 57
End: 2022-11-06

## 2022-11-06 VITALS
BODY MASS INDEX: 37.89 KG/M2 | SYSTOLIC BLOOD PRESSURE: 149 MMHG | RESPIRATION RATE: 16 BRPM | TEMPERATURE: 96.9 F | DIASTOLIC BLOOD PRESSURE: 79 MMHG | WEIGHT: 250 LBS | OXYGEN SATURATION: 97 % | HEART RATE: 67 BPM | HEIGHT: 68 IN

## 2022-11-06 DIAGNOSIS — K04.7 DENTAL ABSCESS: Primary | ICD-10-CM

## 2022-11-06 RX ORDER — CLINDAMYCIN HYDROCHLORIDE 300 MG/1
300 CAPSULE ORAL 3 TIMES DAILY
Qty: 21 CAPSULE | Refills: 0 | Status: SHIPPED | OUTPATIENT
Start: 2022-11-06 | End: 2022-11-13

## 2022-11-06 NOTE — PATIENT INSTRUCTIONS
Dental Abscess   WHAT YOU NEED TO KNOW:   A dental abscess is a collection of pus in or around a tooth  A dental abscess is caused by bacteria  The bacteria can enter the tooth when the enamel (outer part of the tooth) is damaged by tooth decay  Bacteria can also enter the tooth through a chip in the tooth or a cut in the gum  Food particles that are stuck between the teeth for a long time may also lead to an abscess  DISCHARGE INSTRUCTIONS:   Return to the emergency department if:   You have severe pain in your tooth or jaw  You have trouble breathing because of pain or swelling  Call your doctor if:   Your symptoms get worse, even after treatment  Your mouth is bleeding  You cannot eat or drink because of pain or swelling  Your abscess returns  You have an injury that causes a crack in your tooth  You have questions or concerns about your condition or care  Medicines: You may  need any of the following:  Antibiotics  help treat a bacterial infection  NSAIDs , such as ibuprofen, help decrease swelling, pain, and fever  This medicine is available with or without a doctor's order  NSAIDs can cause stomach bleeding or kidney problems in certain people  If you take blood thinner medicine, always ask your healthcare provider if NSAIDs are safe for you  Always read the medicine label and follow directions  Acetaminophen  decreases pain and fever  It is available without a doctor's order  Ask how much to take and how often to take it  Follow directions  Read the labels of all other medicines you are using to see if they also contain acetaminophen, or ask your doctor or pharmacist  Acetaminophen can cause liver damage if not taken correctly  Do not use more than 4 grams (4,000 milligrams) total of acetaminophen in one day  Prescription pain medicine  may be given  Ask your healthcare provider how to take this medicine safely  Some prescription pain medicines contain acetaminophen  Do not take other medicines that contain acetaminophen without talking to your healthcare provider  Too much acetaminophen may cause liver damage  Prescription pain medicine may cause constipation  Ask your healthcare provider how to prevent or treat constipation  Take your medicine as directed  Contact your healthcare provider if you think your medicine is not helping or if you have side effects  Tell him of her if you are allergic to any medicine  Keep a list of the medicines, vitamins, and herbs you take  Include the amounts, and when and why you take them  Bring the list or the pill bottles to follow-up visits  Carry your medicine list with you in case of an emergency  Self-care:   Rinse your mouth every 2 hours with salt water  This will help keep the area clean  Gently brush your teeth twice a day with a soft tooth brush  This will help keep the area clean  Eat soft foods as directed  Soft foods may cause less pain  Examples include applesauce, yogurt, and cooked pasta  Ask your healthcare provider how long to follow this instruction  Apply a warm compress to your tooth or gum  Use a cotton ball or gauze soaked in warm water  Remove the compress in 10 minutes or when it becomes cool  Repeat 3 times a day  Prevent another abscess:   Brush your teeth at least 2 times a day  with fluoride toothpaste  Use dental floss at least once a day  to clean between your teeth  Rinse your mouth with water or mouthwash  after meals and snacks  Chew sugarless gum  Avoid sugary and starchy food that can stick between your teeth  Limit drinks high in sugar, such as soda or fruit juice  See your dentist every 6 months  for dental cleanings and oral exams  Follow up with your doctor or dentist in 24 hours, or as directed: Your healthcare provider will need to check your teeth and gums  Write down your questions so you remember to ask them during your visits     © Copyright [employer] 2022 Information is for End User's use only and may not be sold, redistributed or otherwise used for commercial purposes  All illustrations and images included in CareNotes® are the copyrighted property of [employer]  or [employer]  The above information is an  only  It is not intended as medical advice for individual conditions or treatments  Talk to your doctor, nurse or pharmacist before following any medical regimen to see if it is safe and effective for you

## 2022-11-06 NOTE — PROGRESS NOTES
[facility]        NAME: Janusz Wilkes is a 61 y o  male  : 1965    MRN: [id number]  DATE: 2022  TIME: 10:48 AM    Assessment and Plan   Dental abscess [K04 7]  1  Dental abscess  clindamycin (CLEOCIN) 300 MG capsule     Offered dental block but patient refuses  Patient Instructions     Patient Instructions     Dental Abscess   WHAT YOU NEED TO KNOW:   A dental abscess is a collection of pus in or around a tooth  A dental abscess is caused by bacteria  The bacteria can enter the tooth when the enamel (outer part of the tooth) is damaged by tooth decay  Bacteria can also enter the tooth through a chip in the tooth or a cut in the gum  Food particles that are stuck between the teeth for a long time may also lead to an abscess  DISCHARGE INSTRUCTIONS:   Return to the emergency department if:   · You have severe pain in your tooth or jaw  · You have trouble breathing because of pain or swelling  Call your doctor if:   · Your symptoms get worse, even after treatment  · Your mouth is bleeding  · You cannot eat or drink because of pain or swelling  · Your abscess returns  · You have an injury that causes a crack in your tooth  · You have questions or concerns about your condition or care  Medicines: You may  need any of the following:  · Antibiotics  help treat a bacterial infection  · NSAIDs , such as ibuprofen, help decrease swelling, pain, and fever  This medicine is available with or without a doctor's order  NSAIDs can cause stomach bleeding or kidney problems in certain people  If you take blood thinner medicine, always ask your healthcare provider if NSAIDs are safe for you  Always read the medicine label and follow directions  · Acetaminophen  decreases pain and fever  It is available without a doctor's order  Ask how much to take and how often to take it  Follow directions   Read the labels of all other medicines you are using to see if they also contain acetaminophen, or ask your doctor or pharmacist  Acetaminophen can cause liver damage if not taken correctly  Do not use more than 4 grams (4,000 milligrams) total of acetaminophen in one day  · Prescription pain medicine  may be given  Ask your healthcare provider how to take this medicine safely  Some prescription pain medicines contain acetaminophen  Do not take other medicines that contain acetaminophen without talking to your healthcare provider  Too much acetaminophen may cause liver damage  Prescription pain medicine may cause constipation  Ask your healthcare provider how to prevent or treat constipation  · Take your medicine as directed  Contact your healthcare provider if you think your medicine is not helping or if you have side effects  Tell him of her if you are allergic to any medicine  Keep a list of the medicines, vitamins, and herbs you take  Include the amounts, and when and why you take them  Bring the list or the pill bottles to follow-up visits  Carry your medicine list with you in case of an emergency  Self-care:   · Rinse your mouth every 2 hours with salt water  This will help keep the area clean  · Gently brush your teeth twice a day with a soft tooth brush  This will help keep the area clean  · Eat soft foods as directed  Soft foods may cause less pain  Examples include applesauce, yogurt, and cooked pasta  Ask your healthcare provider how long to follow this instruction  · Apply a warm compress to your tooth or gum  Use a cotton ball or gauze soaked in warm water  Remove the compress in 10 minutes or when it becomes cool  Repeat 3 times a day  Prevent another abscess:   · Brush your teeth at least 2 times a day  with fluoride toothpaste  · Use dental floss at least once a day  to clean between your teeth  · Rinse your mouth with water or mouthwash  after meals and snacks  Chew sugarless gum      · Avoid sugary and starchy food that can stick between your teeth  Limit drinks high in sugar, such as soda or fruit juice  · See your dentist every 6 months  for dental cleanings and oral exams  Follow up with your doctor or dentist in 24 hours, or as directed: Your healthcare provider will need to check your teeth and gums  Write down your questions so you remember to ask them during your visits  © Copyright [employer] 2022 Information is for End User's use only and may not be sold, redistributed or otherwise used for commercial purposes  All illustrations and images included in CareNotes® are the copyrighted property of A [employer] A M , Inc  or [facility] [employer]   The above information is an  only  It is not intended as medical advice for individual conditions or treatments  Talk to your doctor, nurse or pharmacist before following any medical regimen to see if it is safe and effective for you  Follow up with PCP in 3-5 days  Proceed to  ER if symptoms worsen  Chief Complaint     Chief Complaint   Patient presents with   • Dental Pain     Right lower side swelling and pain- started 3 days ago- getting worse, radiates from right ear to jaw; also states he is getting over a cold          History of Present Illness       Patient complains of right lower toothache for the past few days now with swelling of his right cheek and foul taste in his mouth since yesterday  Review of Systems   Review of Systems   Constitutional: Negative for chills and fever  HENT: Positive for dental problem and facial swelling  Respiratory: Negative for shortness of breath  Cardiovascular: Negative for chest pain  Skin: Negative for color change           Current Medications       Current Outpatient Medications:   •  clindamycin (CLEOCIN) 300 MG capsule, Take 1 capsule (300 mg total) by mouth 3 (three) times a day for 7 days, Disp: 21 capsule, Rfl: 0  •  cyclobenzaprine (FLEXERIL) 10 mg tablet, Take 1 tablet (10 mg total) by mouth 3 (three) times a day as needed for muscle spasms (Patient not taking: Reported on 11/6/2022), Disp: 20 tablet, Rfl: 0  •  diazepam (VALIUM) 5 mg tablet, Take 1 tablet (5 mg total) by mouth every 12 (twelve) hours as needed for muscle spasms for up to 10 days, Disp: 20 tablet, Rfl: 0  •  predniSONE 10 mg tablet, Take once daily all days pills on this schedule 6- 6- 5- 4- 3- 2- 1 (Patient not taking: Reported on 11/6/2022), Disp: 27 tablet, Rfl: 0    Current Allergies     Allergies as of 11/06/2022   • (No Known Allergies)            The following portions of the patient's history were reviewed and updated as appropriate: allergies, current medications, past family history, past medical history, past social history, past surgical history and problem list      Past Medical History:   Diagnosis Date   • Allergic        Past Surgical History:   Procedure Laterality Date   • KNEE ARTHROSCOPY Bilateral    • REPLACEMENT TOTAL KNEE BILATERAL         History reviewed  No pertinent family history  Medications have been verified  Objective   /79   Pulse 67   Temp (!) 96 9 °F (36 1 °C)   Resp 16   Ht 5' 8" (1 727 m)   Wt 113 kg (250 lb)   SpO2 97%   BMI 38 01 kg/m²        Physical Exam     Physical Exam  Vitals and nursing note reviewed  Constitutional:       General: He is not in acute distress  Appearance: He is well-developed  He is not diaphoretic  HENT:      Head: Normocephalic and atraumatic  Right Ear: Tympanic membrane, ear canal and external ear normal       Left Ear: Tympanic membrane, ear canal and external ear normal       Nose: Mucosal edema present  No rhinorrhea  Mouth/Throat:      Pharynx: Posterior oropharyngeal erythema present  Cardiovascular:      Rate and Rhythm: Normal rate and regular rhythm  Heart sounds: Normal heart sounds  Musculoskeletal:         General: Normal range of motion  Cervical back: Neck supple     Lymphadenopathy:      Cervical: No cervical adenopathy  Skin:     General: Skin is warm and dry  Coloration: Skin is not pale  Neurological:      Mental Status: He is alert and oriented to person, place, and time     Psychiatric:         Mood and Affect: Mood normal